# Patient Record
Sex: FEMALE | Race: WHITE | NOT HISPANIC OR LATINO | Employment: FULL TIME | URBAN - METROPOLITAN AREA
[De-identification: names, ages, dates, MRNs, and addresses within clinical notes are randomized per-mention and may not be internally consistent; named-entity substitution may affect disease eponyms.]

---

## 2023-08-30 ENCOUNTER — OFFICE VISIT (OUTPATIENT)
Dept: URGENT CARE | Facility: URGENT CARE | Age: 53
End: 2023-08-30
Payer: COMMERCIAL

## 2023-08-30 VITALS
SYSTOLIC BLOOD PRESSURE: 126 MMHG | HEIGHT: 67 IN | BODY MASS INDEX: 34.37 KG/M2 | WEIGHT: 219 LBS | TEMPERATURE: 97.5 F | OXYGEN SATURATION: 95 % | DIASTOLIC BLOOD PRESSURE: 74 MMHG | HEART RATE: 91 BPM

## 2023-08-30 DIAGNOSIS — L30.9 DERMATITIS: Primary | ICD-10-CM

## 2023-08-30 PROCEDURE — 99203 OFFICE O/P NEW LOW 30 MIN: CPT

## 2023-08-31 NOTE — PROGRESS NOTES
URGENT CARE  Assessment & Plan   Assessment:   Zuleyka Huynh is a 53 year old female who's clinical presentation today is consistent with:   1. Dermatitis  - Adult Dermatology Referral; Future  Plan:  Will treat patient's dermatitis} supportively and symptomatically with avoidance of the irritant and adoption of protective measures, along with emollients and moisture; given patient has already tried conservative treatments will also prescribe patient a topical steroid cream. If no improvement (or if worsening) recommend patient follow up with their Pcp in the next 10 days.   No alarm signs or symptoms present   Differential Diagnoses for this patient's chief complaint that I considered include:  Atopic vs allergic vs contact dermatitis, cellulitis, Insect bite/sting, drug reaction, eczema, psoriasis, scabies, tinea,        Patient  is} agreeable to treatment plan and state they will follow-up if symptoms do not improve and/or if symptoms worsen   see patient's AVS 'monitor for' section for specific patient instructions given and discussed regarding what to watch for and when to follow up    [facility] [place]      ______________________________________________________________________      Subjective     HPI: Zuleyka Huynh  is a 53 year old  female who presents today for evaluation the following concerns:   Patient reports a rash noted to bilateral lower legs, which started a few days ago   Patient endorses the rash is not  pruritic.   Patient states the rash is: Pink, irritative-slightly    Patient denies any inflammation, vesicles or blisters.  Patient denies any warmth to the site  Patient denies any drainage, or abscess formation   Patient's rash began gradually}, Since onset rash has had no change in size   Patient states they have not tried anything}  Patient states they have never} had a rash like this before.  Denies triggers such as: any changes to clothing,  "laundry detergents, soaps, or other new household items that might have come in contact with the patient; known exposure to insect, plant, drug exposure (including OTC, alternative medications, or illicit drugs), contact with persons who are knowingly ill, occupational/chemical exposures, tightness or swelling in the tongue/throat/neck.   Patient denies any constitutional symptoms, respiratory difficulty, neither is rash associated w/ fever, arthralgias, URI symptoms, or other recent viral  syndromes. No change in speaking or breathing reported         Review of Systems:  Pertinent review of systems as reflected in HPI, otherwise negative.     Objective    Physical Exam:  Vitals:    08/30/23 1849   BP: 126/74   Pulse: 91   Temp: 97.5  F (36.4  C)   TempSrc: Tympanic   SpO2: 95%   Weight: 99.3 kg (219 lb)   Height: 1.702 m (5' 7\")      General:   alert and oriented, no acute distress, non ill-appearing   Vital signs reviewed: afebrile and normotensive   SKIN:   Bilateral  lower legs:   Noted diffuse, non-raised, maculopapular lesions that are pink and generalized   Lesions do not follow any specific distribution.    No  Vesicles bullae    Rash is not well demarcated, no warmth noted, no edema, no pain to palpation.     No drainage noted, no signs of infection   No abscess seen    -The Secondary morphology is noted as: none       ______________________________________________________________________    I explained my diagnostic considerations and recommendations to the patient, who voiced understanding and agreement with the treatment plan.   All questions were answered.   We discussed potential side effects, risks and benefits of any prescribed or recommended therapies, as well as expectations for response to treatments.  Please see AVS for any patient instructions & handouts given.   Patient was advised to contact the Nurse Care Line, their Primary Care provider, Urgent Care, or the Emergency Department if there are " new or worsening symptoms, or call 911 for emergencies.

## 2023-08-31 NOTE — PATIENT INSTRUCTIONS
Diagnosis: contact dermatitis - skin rash   Plan:   Itch relief : , hydrocortisone cream    Cool compress,   Benadryl or antihistamine at night daily    Supportive   Keep rash open, do not cover, air helps to dry rash   Wear loose clothing   Avoid soaps, harsh chemicals   Prevention   Try to identify irritant and avoid   Follow up     Monitor for:   Fever of 101 F  Redness or swelling that gets worse  Pain that gets worse. Babies may show pain with fussiness that can't be soothed.  Bad-smelling fluid leaking from the skin  New rash on other parts of the body. This includes around the eyes, mouth, or genitals.  Difficulty breathing or shortness of breath         Contact Dermatitis:   Contact dermatitis is a skin rash caused by something that touches the skin and makes it irritated and inflamed.    skin may be red, swollen, dry, and cracked. Blisters may form and ooze. The rash will itch.  Contact dermatitis can form anywhere on body.   It can be caused from exposure to animals, soaps and detergents, plants, such as poison ivy, oak, or sumac.    Other common causes are metals such as jewelry or new skin creams   Contact dermatitis is not passed from person to person.      Long-term risks of topical corticosteroids:    - skin atrophy (thinning), telangiectasia, striae (stretch marks), glaucoma, adrenocortical suppression,   rebound flare, steroid withdrawal,    Do not apply to thin skin areas such as face or groin     Common triggers: harsh soaps, detergents,    Wool, abrasive fabrics, tight-fitting clothing ,    Chemicals: formaldehyde, airborne irrit    (smk, tobacco, air pollution)    And extreme transitions in temp, cold temperatures    Hot water

## 2024-03-25 ENCOUNTER — OFFICE VISIT (OUTPATIENT)
Dept: FAMILY MEDICINE | Facility: CLINIC | Age: 54
End: 2024-03-25
Payer: COMMERCIAL

## 2024-03-25 VITALS
OXYGEN SATURATION: 98 % | RESPIRATION RATE: 16 BRPM | TEMPERATURE: 98.9 F | WEIGHT: 213 LBS | SYSTOLIC BLOOD PRESSURE: 138 MMHG | BODY MASS INDEX: 33.36 KG/M2 | DIASTOLIC BLOOD PRESSURE: 84 MMHG | HEART RATE: 88 BPM

## 2024-03-25 DIAGNOSIS — J40 BRONCHITIS: Primary | ICD-10-CM

## 2024-03-25 PROCEDURE — 99213 OFFICE O/P EST LOW 20 MIN: CPT | Performed by: FAMILY MEDICINE

## 2024-03-25 RX ORDER — AZITHROMYCIN 250 MG/1
TABLET, FILM COATED ORAL
Qty: 6 TABLET | Refills: 0 | Status: SHIPPED | OUTPATIENT
Start: 2024-03-25 | End: 2024-03-30

## 2024-03-25 ASSESSMENT — PAIN SCALES - GENERAL: PAINLEVEL: NO PAIN (0)

## 2024-03-25 NOTE — PROGRESS NOTES
"S: Zuleyka Huynh is a 53 year old female with cough, congestion, phlegm for almost 3 weeks.  Not improving.      No GI sx.  No fever.     Eating/drinking fine    O:/84   Pulse 88   Temp 98.9  F (37.2  C) (Tympanic)   Resp 16   Wt 96.6 kg (213 lb)   LMP  (LMP Unknown)   SpO2 98%   BMI 33.36 kg/m    GEN: Alert and oriented, in no acute distress  CV: RRR, no murmur  RESP: lungs clear bilaterally, good effort  EXT: no edema or lesions noted in lower extremities  Neck: neck supple without mass or lymphadenopathy  ENT: oropharynx clear, no exudate or palate/tonsil asymmetry  Ears fine    A: bronchitis    P: given lack of improvement, will treat as possible \"walking pneumonia\" or bronchitis with bacterial component.  Zpack sent.  May take a few more weeks to completely resolve, if not improving will follow up for imaging and further workup      Discussed physical for other medical issues, she will consider.  \"I don't usually go to the doctor\"    "

## 2024-04-22 ENCOUNTER — ANCILLARY PROCEDURE (OUTPATIENT)
Dept: GENERAL RADIOLOGY | Facility: CLINIC | Age: 54
End: 2024-04-22
Attending: FAMILY MEDICINE
Payer: COMMERCIAL

## 2024-04-22 ENCOUNTER — OFFICE VISIT (OUTPATIENT)
Dept: FAMILY MEDICINE | Facility: CLINIC | Age: 54
End: 2024-04-22
Payer: COMMERCIAL

## 2024-04-22 VITALS
HEIGHT: 67 IN | TEMPERATURE: 98.6 F | RESPIRATION RATE: 16 BRPM | HEART RATE: 70 BPM | DIASTOLIC BLOOD PRESSURE: 86 MMHG | OXYGEN SATURATION: 96 % | SYSTOLIC BLOOD PRESSURE: 138 MMHG | BODY MASS INDEX: 33.59 KG/M2 | WEIGHT: 214 LBS

## 2024-04-22 DIAGNOSIS — R05.3 PERSISTENT COUGH: ICD-10-CM

## 2024-04-22 DIAGNOSIS — R05.3 PERSISTENT COUGH: Primary | ICD-10-CM

## 2024-04-22 DIAGNOSIS — R06.2 WHEEZING: ICD-10-CM

## 2024-04-22 LAB
ALBUMIN SERPL BCG-MCNC: 4.2 G/DL (ref 3.5–5.2)
ALP SERPL-CCNC: 97 U/L (ref 40–150)
ALT SERPL W P-5'-P-CCNC: 17 U/L (ref 0–50)
ANION GAP SERPL CALCULATED.3IONS-SCNC: 11 MMOL/L (ref 7–15)
AST SERPL W P-5'-P-CCNC: 16 U/L (ref 0–45)
BILIRUB SERPL-MCNC: 0.3 MG/DL
BUN SERPL-MCNC: 23.8 MG/DL (ref 6–20)
CALCIUM SERPL-MCNC: 9.4 MG/DL (ref 8.6–10)
CHLORIDE SERPL-SCNC: 101 MMOL/L (ref 98–107)
CREAT SERPL-MCNC: 1.11 MG/DL (ref 0.51–0.95)
DEPRECATED HCO3 PLAS-SCNC: 28 MMOL/L (ref 22–29)
EGFRCR SERPLBLD CKD-EPI 2021: 59 ML/MIN/1.73M2
ERYTHROCYTE [DISTWIDTH] IN BLOOD BY AUTOMATED COUNT: 12.8 % (ref 10–15)
GLUCOSE SERPL-MCNC: 92 MG/DL (ref 70–99)
HCT VFR BLD AUTO: 45 % (ref 35–47)
HGB BLD-MCNC: 14.8 G/DL (ref 11.7–15.7)
MCH RBC QN AUTO: 29.3 PG (ref 26.5–33)
MCHC RBC AUTO-ENTMCNC: 32.9 G/DL (ref 31.5–36.5)
MCV RBC AUTO: 89 FL (ref 78–100)
PLATELET # BLD AUTO: 298 10E3/UL (ref 150–450)
POTASSIUM SERPL-SCNC: 4.4 MMOL/L (ref 3.4–5.3)
PROT SERPL-MCNC: 8 G/DL (ref 6.4–8.3)
RBC # BLD AUTO: 5.05 10E6/UL (ref 3.8–5.2)
SODIUM SERPL-SCNC: 140 MMOL/L (ref 135–145)
WBC # BLD AUTO: 7.4 10E3/UL (ref 4–11)

## 2024-04-22 PROCEDURE — 85027 COMPLETE CBC AUTOMATED: CPT | Performed by: FAMILY MEDICINE

## 2024-04-22 PROCEDURE — 80053 COMPREHEN METABOLIC PANEL: CPT | Performed by: FAMILY MEDICINE

## 2024-04-22 PROCEDURE — 36415 COLL VENOUS BLD VENIPUNCTURE: CPT | Performed by: FAMILY MEDICINE

## 2024-04-22 PROCEDURE — 71046 X-RAY EXAM CHEST 2 VIEWS: CPT | Mod: TC | Performed by: RADIOLOGY

## 2024-04-22 PROCEDURE — 99214 OFFICE O/P EST MOD 30 MIN: CPT | Performed by: FAMILY MEDICINE

## 2024-04-22 RX ORDER — ALBUTEROL SULFATE 90 UG/1
2 AEROSOL, METERED RESPIRATORY (INHALATION) EVERY 4 HOURS PRN
Qty: 18 G | Refills: 0 | Status: SHIPPED | OUTPATIENT
Start: 2024-04-22

## 2024-04-22 ASSESSMENT — PAIN SCALES - GENERAL: PAINLEVEL: MILD PAIN (2)

## 2024-04-22 NOTE — PROGRESS NOTES
"S :Zuleyka Huynh is a 53 year old female with chronic cough for a year or two.  Some gerd off/on, some nasal drip off/on, but doesn't think those are a big thing necessarily.    No meds, no medical hx.  Doesn't doctor much    Gave a zpack a few weeks ago, didn't help much    No asthma hx.      Some wheeze when lying down.  No LE edema or heart hx.     No acute changes.     O:/86   Pulse 70   Temp 98.6  F (37  C) (Oral)   Resp 16   Ht 1.702 m (5' 7\")   Wt 97.1 kg (214 lb)   LMP  (LMP Unknown)   SpO2 96%   BMI 33.52 kg/m    GEN: Alert and oriented, in no acute distress  CV: RRR, no murmur  RESP: lungs clear bilaterally, good effort  EXT: no edema or lesions noted in lower extremities  Neck: neck supple without mass or lymphadenopathy  ENT: oropharynx clear, no exudate or palate/tonsil asymmetry    Cxr looks OK  Cbc normal  comp met pending    A: wheeze       Chronic cough    P: try albuterol.  Make sure echocardiogram is normal.  Follow up after albuterol trials and echo/.   Spirometry next?  PPI trial?  Possibly nightime zyrtec?      "

## 2024-04-22 NOTE — LETTER
April 24, 2024      Zuleyka Huynh  [address] [id number]        Dear ,    We are writing to inform you of your test results.    Labs normal.  The creatinine is listed as abnormal, but it's normal variation.       Resulted Orders   Comprehensive metabolic panel (BMP + Alb, Alk Phos, ALT, AST, Total. Bili, TP)   Result Value Ref Range    Sodium 140 135 - 145 mmol/L      Comment:      Reference intervals for this test were updated on 09/26/2023 to more accurately reflect our healthy population. There may be differences in the flagging of prior results with similar values performed with this method. Interpretation of those prior results can be made in the context of the updated reference intervals.     Potassium 4.4 3.4 - 5.3 mmol/L    Carbon Dioxide (CO2) 28 22 - 29 mmol/L    Anion Gap 11 7 - 15 mmol/L    Urea Nitrogen 23.8 (H) 6.0 - 20.0 mg/dL    Creatinine 1.11 (H) 0.51 - 0.95 mg/dL    GFR Estimate 59 (L) >60 mL/min/1.73m2    Calcium 9.4 8.6 - 10.0 mg/dL    Chloride 101 98 - 107 mmol/L    Glucose 92 70 - 99 mg/dL    Alkaline Phosphatase 97 40 - 150 U/L      Comment:      Reference intervals for this test were updated on 11/14/2023 to more accurately reflect our healthy population. There may be differences in the flagging of prior results with similar values performed with this method. Interpretation of those prior results can be made in the context of the updated reference intervals.    AST 16 0 - 45 U/L      Comment:      Reference intervals for this test were updated on 6/12/2023 to more accurately reflect our healthy population. There may be differences in the flagging of prior results with similar values performed with this method. Interpretation of those prior results can be made in the context of the updated reference intervals.    ALT 17 0 - 50 U/L      Comment:      Reference intervals for this test were updated on 6/12/2023 to more accurately reflect our healthy population.  There may be differences in the flagging of prior results with similar values performed with this method. Interpretation of those prior results can be made in the context of the updated reference intervals.      Protein Total 8.0 6.4 - 8.3 g/dL    Albumin 4.2 3.5 - 5.2 g/dL    Bilirubin Total 0.3 <=1.2 mg/dL   CBC with platelets   Result Value Ref Range    WBC Count 7.4 4.0 - 11.0 10e3/uL    RBC Count 5.05 3.80 - 5.20 10e6/uL    Hemoglobin 14.8 11.7 - 15.7 g/dL    Hematocrit 45.0 35.0 - 47.0 %    MCV 89 78 - 100 fL    MCH 29.3 26.5 - 33.0 pg    MCHC 32.9 31.5 - 36.5 g/dL    RDW 12.8 10.0 - 15.0 %    Platelet Count 298 150 - 450 10e3/uL       If you have any questions or concerns, please call the clinic at the number listed above.       Sincerely,      Chetan Guzman MD